# Patient Record
Sex: FEMALE | Race: WHITE
[De-identification: names, ages, dates, MRNs, and addresses within clinical notes are randomized per-mention and may not be internally consistent; named-entity substitution may affect disease eponyms.]

---

## 2023-09-05 ENCOUNTER — APPOINTMENT (OUTPATIENT)
Dept: OBGYN | Facility: CLINIC | Age: 73
End: 2023-09-05

## 2024-04-02 ENCOUNTER — APPOINTMENT (OUTPATIENT)
Dept: PULMONOLOGY | Facility: CLINIC | Age: 74
End: 2024-04-02
Payer: MEDICARE

## 2024-04-02 ENCOUNTER — APPOINTMENT (OUTPATIENT)
Dept: PULMONOLOGY | Facility: CLINIC | Age: 74
End: 2024-04-02
Payer: SELF-PAY

## 2024-04-02 VITALS
DIASTOLIC BLOOD PRESSURE: 79 MMHG | HEIGHT: 62 IN | RESPIRATION RATE: 15 BRPM | OXYGEN SATURATION: 97 % | WEIGHT: 106 LBS | HEART RATE: 86 BPM | BODY MASS INDEX: 19.51 KG/M2 | TEMPERATURE: 97 F | SYSTOLIC BLOOD PRESSURE: 113 MMHG

## 2024-04-02 DIAGNOSIS — Z87.39 PERSONAL HISTORY OF OTHER DISEASES OF THE MUSCULOSKELETAL SYSTEM AND CONNECTIVE TISSUE: ICD-10-CM

## 2024-04-02 DIAGNOSIS — J98.4 EMPHYSEMA, UNSPECIFIED: ICD-10-CM

## 2024-04-02 DIAGNOSIS — R05.9 COUGH, UNSPECIFIED: ICD-10-CM

## 2024-04-02 DIAGNOSIS — J43.9 EMPHYSEMA, UNSPECIFIED: ICD-10-CM

## 2024-04-02 DIAGNOSIS — J18.9 PNEUMONIA, UNSPECIFIED ORGANISM: ICD-10-CM

## 2024-04-02 PROBLEM — Z00.00 ENCOUNTER FOR PREVENTIVE HEALTH EXAMINATION: Status: ACTIVE | Noted: 2024-04-02

## 2024-04-02 PROCEDURE — 94799 UNLISTED PULMONARY SVC/PX: CPT

## 2024-04-02 PROCEDURE — 94729 DIFFUSING CAPACITY: CPT

## 2024-04-02 PROCEDURE — 99204 OFFICE O/P NEW MOD 45 MIN: CPT | Mod: 25

## 2024-04-02 PROCEDURE — ZZZZZ: CPT

## 2024-04-02 PROCEDURE — 94060 EVALUATION OF WHEEZING: CPT

## 2024-04-02 PROCEDURE — 94726 PLETHYSMOGRAPHY LUNG VOLUMES: CPT

## 2024-04-02 RX ORDER — LEVOFLOXACIN 500 MG/1
500 TABLET, FILM COATED ORAL DAILY
Qty: 2 | Refills: 0 | Status: ACTIVE | COMMUNITY
Start: 2024-04-02 | End: 1900-01-01

## 2024-04-02 RX ORDER — HYDROCODONE BITARTRATE AND HOMATROPINE METHYLBROMIDE 1.5; 5 MG/5ML; MG/5ML
5-1.5 SOLUTION ORAL AT BEDTIME
Qty: 150 | Refills: 0 | Status: ACTIVE | COMMUNITY
Start: 2024-04-02 | End: 1900-01-01

## 2024-04-02 RX ORDER — SODIUM CHLORIDE FOR INHALATION 0.9 %
0.9 VIAL, NEBULIZER (ML) INHALATION
Qty: 1 | Refills: 5 | Status: ACTIVE | COMMUNITY
Start: 2024-04-02 | End: 1900-01-01

## 2024-04-02 NOTE — PHYSICAL EXAM
[No Acute Distress] : no acute distress [Normal Oropharynx] : normal oropharynx [No Neck Mass] : no neck mass [Normal Appearance] : normal appearance [Normal Rate/Rhythm] : normal rate/rhythm [Normal S1, S2] : normal s1, s2 [No Resp Distress] : no resp distress [No Murmurs] : no murmurs [Clear to Auscultation Bilaterally] : clear to auscultation bilaterally [Benign] : benign [No Abnormalities] : no abnormalities [No Clubbing] : no clubbing [No Cyanosis] : no cyanosis [Normal Gait] : normal gait [No Edema] : no edema [FROM] : FROM [Normal Color/ Pigmentation] : normal color/ pigmentation [No Focal Deficits] : no focal deficits [Normal Affect] : normal affect [Oriented x3] : oriented x3

## 2024-04-02 NOTE — ASSESSMENT
[FreeTextEntry1] : -PFT done today: Normal findings -Complete Levaquin 500 for 5 days as ordered, prescribed 2 additional days  -Afrin for 3 days at bedtime; cough worse at night with interrupted sleep  -Hycodan 5ml at bedtime only; cough relief at HS -Patient and family will provide CD from  -Albuterol PRN -Prescribed nebulized NS  -Nebulizer ordered  -Use Acapella/Areobika device at 2X daily, up to 3 times as needed -F/U CT scan end of April to assess resolution of PNA  CT scan 3/29/24: Shows resolving left lower lobe PNA with mild postinfectious/inflammatory infiltrate in the lingula. With F/U CT next months to assess complete resolution.  Attending: agree with above accomp by daughter nonsmoker, no prior pulm hx dx with pna by CXR  at  in [place] about 3 weeks ago. tx with doxy. Seen in an ED in [place] after for shoulder pain, CXR reported as better. Now back in [place]. Still coughing. CT as above. Started on levaquin. Stlill coughing -- keeping up all night, worst when lies down. but sputum now clear. afebrile. cough wet but no sputum comin up. No obstruction or BD response on PFT. Complete 7 days course levaquin. Neb saline and aerobika.

## 2024-04-02 NOTE — REASON FOR VISIT
[Initial] : an initial visit [Pneumonia] : pneumonia [Cough] : cough [Family Member] : family member

## 2024-04-02 NOTE — HISTORY OF PRESENT ILLNESS
[Never] : never [TextBox_4] : Patient is a 75y/o F with PMH of Osteoporosis who presents with daughter to establish pulmonary care.   Endorsed history of Pneumonia and cough one month ago, treated with Doxy, cough lingered with thick greenish sputum, she was then prescribed Levaquin for 5 days, extended to 7 days on this visit. Endorses overall fatigue, reduced appetite, and poor sleep. Currently denies h, SOB, wheezing, fever, chills, chest tightness, hoarseness, change in voice, or rhinorrhea. Is prescribed albuterol which she does not use currently.  With no known history of childhood asthma, eczema, frequent URI, history of PNA, or intubation.  Does not smoke, with no pets, and with no exposure to occupational/environmental hazards, worked in an office.

## 2024-04-02 NOTE — REVIEW OF SYSTEMS
[Fatigue] : fatigue [Cough] : cough [Sputum] : sputum [Negative] : Endocrine [Fever] : no fever [Recent Wt Gain (___ Lbs)] : ~T no recent weight gain [EDS] : no EDS [Recent Wt Loss (___ Lbs)] : ~T no recent weight loss [Chills] : no chills [Poor Appetite] : no poor appetite [Hemoptysis] : no hemoptysis [Frequent URIs] : no frequent URIs [Chest Tightness] : no chest tightness [Dyspnea] : no dyspnea [Wheezing] : no wheezing [Pleuritic Pain] : no pleuritic pain [SOB on Exertion] : no sob on exertion [A.M. Dry Mouth] : no a.m. dry mouth

## 2024-05-08 ENCOUNTER — NON-APPOINTMENT (OUTPATIENT)
Age: 74
End: 2024-05-08

## 2024-05-28 ENCOUNTER — NON-APPOINTMENT (OUTPATIENT)
Age: 74
End: 2024-05-28

## 2024-06-10 ENCOUNTER — APPOINTMENT (OUTPATIENT)
Dept: PULMONOLOGY | Facility: CLINIC | Age: 74
End: 2024-06-10